# Patient Record
Sex: FEMALE | Race: WHITE | NOT HISPANIC OR LATINO | URBAN - METROPOLITAN AREA
[De-identification: names, ages, dates, MRNs, and addresses within clinical notes are randomized per-mention and may not be internally consistent; named-entity substitution may affect disease eponyms.]

---

## 2019-11-08 PROBLEM — Z00.00 ENCOUNTER FOR PREVENTIVE HEALTH EXAMINATION: Status: ACTIVE | Noted: 2019-11-08

## 2019-12-04 ENCOUNTER — OUTPATIENT (OUTPATIENT)
Dept: OUTPATIENT SERVICES | Facility: HOSPITAL | Age: 49
LOS: 1 days | Discharge: HOME | End: 2019-12-04

## 2019-12-04 ENCOUNTER — APPOINTMENT (OUTPATIENT)
Dept: OBGYN | Facility: HOSPITAL | Age: 49
End: 2019-12-04
Payer: SELF-PAY

## 2019-12-04 VITALS
SYSTOLIC BLOOD PRESSURE: 120 MMHG | DIASTOLIC BLOOD PRESSURE: 70 MMHG | RESPIRATION RATE: 14 BRPM | HEART RATE: 72 BPM | WEIGHT: 156 LBS | TEMPERATURE: 97.1 F

## 2019-12-04 DIAGNOSIS — Z78.9 OTHER SPECIFIED HEALTH STATUS: ICD-10-CM

## 2019-12-04 DIAGNOSIS — Z01.419 ENCOUNTER FOR GYNECOLOGICAL EXAMINATION (GENERAL) (ROUTINE) W/OUT ABNORMAL FINDINGS: ICD-10-CM

## 2019-12-04 PROCEDURE — 99386 PREV VISIT NEW AGE 40-64: CPT

## 2019-12-11 DIAGNOSIS — Z01.419 ENCOUNTER FOR GYNECOLOGICAL EXAMINATION (GENERAL) (ROUTINE) WITHOUT ABNORMAL FINDINGS: ICD-10-CM

## 2019-12-13 ENCOUNTER — RX CHANGE (OUTPATIENT)
Age: 49
End: 2019-12-13

## 2019-12-13 LAB
A VAGINAE DNA VAG QL NAA+PROBE: ABNORMAL
BVAB2 DNA VAG QL NAA+PROBE: NORMAL
C KRUSEI DNA VAG QL NAA+PROBE: NEGATIVE
C TRACH RRNA SPEC QL NAA+PROBE: NEGATIVE
HPV HIGH+LOW RISK DNA PNL CVX: NOT DETECTED
MEGA1 DNA VAG QL NAA+PROBE: ABNORMAL
N GONORRHOEA RRNA SPEC QL NAA+PROBE: NEGATIVE
T VAGINALIS RRNA SPEC QL NAA+PROBE: NEGATIVE

## 2019-12-13 RX ORDER — METRONIDAZOLE 500 MG/1
500 TABLET ORAL TWICE DAILY
Qty: 14 | Refills: 0 | Status: ACTIVE | COMMUNITY
Start: 2019-12-13 | End: 1900-01-01

## 2020-01-30 ENCOUNTER — APPOINTMENT (OUTPATIENT)
Dept: INTERNAL MEDICINE | Facility: HOSPITAL | Age: 50
End: 2020-01-30

## 2022-01-11 ENCOUNTER — INPATIENT (INPATIENT)
Facility: HOSPITAL | Age: 52
LOS: 3 days | Discharge: HOME | End: 2022-01-15
Attending: INTERNAL MEDICINE | Admitting: INTERNAL MEDICINE
Payer: SELF-PAY

## 2022-01-11 VITALS
RESPIRATION RATE: 18 BRPM | DIASTOLIC BLOOD PRESSURE: 59 MMHG | HEART RATE: 88 BPM | SYSTOLIC BLOOD PRESSURE: 115 MMHG | TEMPERATURE: 99 F | OXYGEN SATURATION: 98 %

## 2022-01-11 LAB
ALBUMIN SERPL ELPH-MCNC: 4.3 G/DL — SIGNIFICANT CHANGE UP (ref 3.5–5.2)
ALP SERPL-CCNC: 120 U/L — HIGH (ref 30–115)
ALT FLD-CCNC: 90 U/L — HIGH (ref 0–41)
ANION GAP SERPL CALC-SCNC: 10 MMOL/L — SIGNIFICANT CHANGE UP (ref 7–14)
AST SERPL-CCNC: 38 U/L — SIGNIFICANT CHANGE UP (ref 0–41)
BASOPHILS # BLD AUTO: 0.02 K/UL — SIGNIFICANT CHANGE UP (ref 0–0.2)
BASOPHILS NFR BLD AUTO: 0.3 % — SIGNIFICANT CHANGE UP (ref 0–1)
BILIRUB SERPL-MCNC: 2.7 MG/DL — HIGH (ref 0.2–1.2)
BLD GP AB SCN SERPL QL: SIGNIFICANT CHANGE UP
BUN SERPL-MCNC: 14 MG/DL — SIGNIFICANT CHANGE UP (ref 10–20)
CALCIUM SERPL-MCNC: 8.2 MG/DL — LOW (ref 8.5–10.1)
CHLORIDE SERPL-SCNC: 103 MMOL/L — SIGNIFICANT CHANGE UP (ref 98–110)
CO2 SERPL-SCNC: 22 MMOL/L — SIGNIFICANT CHANGE UP (ref 17–32)
CREAT SERPL-MCNC: 0.5 MG/DL — LOW (ref 0.7–1.5)
EOSINOPHIL # BLD AUTO: 0.27 K/UL — SIGNIFICANT CHANGE UP (ref 0–0.7)
EOSINOPHIL NFR BLD AUTO: 4.1 % — SIGNIFICANT CHANGE UP (ref 0–8)
GLUCOSE SERPL-MCNC: 111 MG/DL — HIGH (ref 70–99)
HCG SERPL QL: NEGATIVE — SIGNIFICANT CHANGE UP
HCT VFR BLD CALC: 17.9 % — LOW (ref 37–47)
HGB BLD-MCNC: 5.6 G/DL — CRITICAL LOW (ref 12–16)
IMM GRANULOCYTES NFR BLD AUTO: 1.4 % — HIGH (ref 0.1–0.3)
LYMPHOCYTES # BLD AUTO: 1.94 K/UL — SIGNIFICANT CHANGE UP (ref 1.2–3.4)
LYMPHOCYTES # BLD AUTO: 29.2 % — SIGNIFICANT CHANGE UP (ref 20.5–51.1)
MAGNESIUM SERPL-MCNC: 2.1 MG/DL — SIGNIFICANT CHANGE UP (ref 1.8–2.4)
MCHC RBC-ENTMCNC: 31.1 PG — HIGH (ref 27–31)
MCHC RBC-ENTMCNC: 31.3 G/DL — LOW (ref 32–37)
MCV RBC AUTO: 99.4 FL — HIGH (ref 81–99)
MONOCYTES # BLD AUTO: 0.3 K/UL — SIGNIFICANT CHANGE UP (ref 0.1–0.6)
MONOCYTES NFR BLD AUTO: 4.5 % — SIGNIFICANT CHANGE UP (ref 1.7–9.3)
NEUTROPHILS # BLD AUTO: 4.02 K/UL — SIGNIFICANT CHANGE UP (ref 1.4–6.5)
NEUTROPHILS NFR BLD AUTO: 60.5 % — SIGNIFICANT CHANGE UP (ref 42.2–75.2)
NRBC # BLD: 0 /100 WBCS — SIGNIFICANT CHANGE UP (ref 0–0)
NT-PROBNP SERPL-SCNC: 70 PG/ML — SIGNIFICANT CHANGE UP (ref 0–300)
PLATELET # BLD AUTO: 467 K/UL — HIGH (ref 130–400)
POTASSIUM SERPL-MCNC: 4.1 MMOL/L — SIGNIFICANT CHANGE UP (ref 3.5–5)
POTASSIUM SERPL-SCNC: 4.1 MMOL/L — SIGNIFICANT CHANGE UP (ref 3.5–5)
PROT SERPL-MCNC: 6.8 G/DL — SIGNIFICANT CHANGE UP (ref 6–8)
RBC # BLD: 1.77 M/UL — LOW (ref 4.2–5.4)
RBC # BLD: 1.8 M/UL — LOW (ref 4.2–5.4)
RBC # FLD: 19.3 % — HIGH (ref 11.5–14.5)
RETICS #: 197.4 K/UL — HIGH (ref 25–125)
RETICS/RBC NFR: 11.2 % — HIGH (ref 0.5–1.5)
SARS-COV-2 RNA SPEC QL NAA+PROBE: DETECTED
SODIUM SERPL-SCNC: 135 MMOL/L — SIGNIFICANT CHANGE UP (ref 135–146)
TROPONIN T SERPL-MCNC: <0.01 NG/ML — SIGNIFICANT CHANGE UP
WBC # BLD: 6.64 K/UL — SIGNIFICANT CHANGE UP (ref 4.8–10.8)
WBC # FLD AUTO: 6.64 K/UL — SIGNIFICANT CHANGE UP (ref 4.8–10.8)

## 2022-01-11 PROCEDURE — 93010 ELECTROCARDIOGRAM REPORT: CPT

## 2022-01-11 PROCEDURE — 99285 EMERGENCY DEPT VISIT HI MDM: CPT

## 2022-01-11 PROCEDURE — 71045 X-RAY EXAM CHEST 1 VIEW: CPT | Mod: 26

## 2022-01-11 PROCEDURE — 99223 1ST HOSP IP/OBS HIGH 75: CPT

## 2022-01-11 RX ORDER — ONDANSETRON 8 MG/1
4 TABLET, FILM COATED ORAL EVERY 8 HOURS
Refills: 0 | Status: DISCONTINUED | OUTPATIENT
Start: 2022-01-11 | End: 2022-01-15

## 2022-01-11 RX ORDER — ACETAMINOPHEN 500 MG
650 TABLET ORAL EVERY 6 HOURS
Refills: 0 | Status: DISCONTINUED | OUTPATIENT
Start: 2022-01-11 | End: 2022-01-15

## 2022-01-11 RX ORDER — LANOLIN ALCOHOL/MO/W.PET/CERES
5 CREAM (GRAM) TOPICAL AT BEDTIME
Refills: 0 | Status: DISCONTINUED | OUTPATIENT
Start: 2022-01-11 | End: 2022-01-15

## 2022-01-11 RX ORDER — SODIUM CHLORIDE 9 MG/ML
1000 INJECTION, SOLUTION INTRAVENOUS
Refills: 0 | Status: DISCONTINUED | OUTPATIENT
Start: 2022-01-11 | End: 2022-01-15

## 2022-01-11 RX ORDER — ASPIRIN/CALCIUM CARB/MAGNESIUM 324 MG
325 TABLET ORAL ONCE
Refills: 0 | Status: COMPLETED | OUTPATIENT
Start: 2022-01-11 | End: 2022-01-11

## 2022-01-11 RX ORDER — ACETAMINOPHEN 500 MG
650 TABLET ORAL ONCE
Refills: 0 | Status: COMPLETED | OUTPATIENT
Start: 2022-01-11 | End: 2022-01-11

## 2022-01-11 RX ADMIN — Medication 650 MILLIGRAM(S): at 19:15

## 2022-01-11 RX ADMIN — Medication 325 MILLIGRAM(S): at 18:35

## 2022-01-11 NOTE — H&P ADULT - HISTORY OF PRESENT ILLNESS
51F no pmhx presents with pinching chest pain that started yesterday, intermittent, worse with certain motions, worse when she climbs the stairs, denies syncope/diaphoresis/shortness of breath. no echo/stress tests in the past, does not follow with cards.  51F no pmhx presents with PELAYO,  pinching chest pain that started yesterday, intermittent, worse with certain motions, worse when she climbs the stairs, denies syncope/diaphoresis/shortness of breath. no echo/stress tests in the past, does not follow with cards.  Pt denies BRBPR, & melena.  Pt appears NAD accompanied by spouse providing additional HPI.

## 2022-01-11 NOTE — H&P ADULT - ASSESSMENT
51F no pmhx presents with pinching chest pain that started yesterday, intermittent, worse with certain motions, worse when she climbs the stairs, denies syncope/diaphoresis/shortness of breath. no echo/stress tests in the past, does not follow with cards.      # Anemia  - PRBC X   - trend labs  - Hem/Onc consult      51F no pmhx presents with PELAYO,  pinching chest pain that started yesterday, intermittent, worse with certain motions, worse when she climbs the stairs, denies syncope/diaphoresis/shortness of breath. no echo/stress tests in the past, does not follow with cards.  Pt denies BRBPR, & melena.  Pt appears NAD accompanied by spouse providing additional HPI.    Pt denies home meds.        # Anemia  - PRBC X 2  - trend labs  - Hem/Onc consult  - Oxygen 2ltr NC          51F no pmhx presents with PELAYO,  pinching chest pain that started yesterday, intermittent, worse with certain motions, worse when she climbs the stairs, denies syncope/diaphoresis/shortness of breath. no echo/stress tests in the past, does not follow with cards.  Pt denies BRBPR, & melena.  Pt appears NAD accompanied by spouse providing additional HPI.    Pt denies home meds.        # Anemia  - PRBC X 2  - trend labs  - Hematology Consult  - Oxygen 2ltr NC  - SHANTHI:  negative.

## 2022-01-11 NOTE — ED PROVIDER NOTE - CLINICAL SUMMARY MEDICAL DECISION MAKING FREE TEXT BOX
52yo F no significant past medical history presenting with intermittent chest pain, worse on exertion.  no cardiac history. pale appearing, NAD, non toxic. NCAT PERRLA EOMI neck supple non tender normal wob cta bl rrr abdomen s nt nd no rebound no guarding WWPx4 neuro non focal. hb low. SHANTHI neg. will transfuse and admit for anemia workup.

## 2022-01-11 NOTE — H&P ADULT - EXTREMITIES
----- Message from Jorge Garcia sent at 10/21/2020 12:45 PM CDT -----  Regarding: results of mammo  Type:  Test Results    Who Called: Carol     Name of Test (Lab/Mammo/Etc): mammo    Date of Test: 10/20    Ordering Provider: Dr. Yadav     Where the test was performed: [facility]     Would the patient rather a call back or a response via [facility]? Call back    Best Call Back Number: [phone]      
Left voicemail advising patient that her the radiology report is not back. We will contact her with her results.     
No cyanosis, clubbing or edema

## 2022-01-11 NOTE — ED ADULT TRIAGE NOTE - ESI TRIAGE ACUITY LEVEL, MLM
MD received a form from [facility] and it states it's needed for preventative care for COPD. Patient does not have dx listed.     Called patient and discussed that extended follow up (40 min) for evaluation is needed prior to completing forms. Patient verbalized understanding but was unable to schedule at the time of the call.  
2

## 2022-01-11 NOTE — ED PROVIDER NOTE - OBJECTIVE STATEMENT
51F no pmhx presents with pinching chest pain that started yesterday, intermittent, worse with certain motions, worse when she climbs the stairs, denies syncope/diaphoresis/shortness of breath. no echo/stress tests in the past, does not follow with cards.

## 2022-01-11 NOTE — ED ADULT NURSE REASSESSMENT NOTE - NS ED NURSE REASSESS COMMENT FT1
pt admitted to medicine to be transferred to [address] ED. Charge ABEBE Parker aware of transfer. pt covid +, vss at this time. pt to get 2 units PRBC pending t&s.

## 2022-01-11 NOTE — H&P ADULT - ATTENDING COMMENTS
SHANTHI done in ER is negative Seen at bedside along side medical PA. Of note, SHANTHI done in ER is negative (with negative troponin and normal EKG strongly doubt any cardiac issue. . Agree with assessment and plan as outlined above

## 2022-01-12 LAB
BASOPHILS # BLD AUTO: 0.02 K/UL — SIGNIFICANT CHANGE UP (ref 0–0.2)
BASOPHILS NFR BLD AUTO: 0.3 % — SIGNIFICANT CHANGE UP (ref 0–1)
D DIMER BLD IA.RAPID-MCNC: 785 NG/ML DDU — HIGH (ref 0–230)
EOSINOPHIL # BLD AUTO: 0.3 K/UL — SIGNIFICANT CHANGE UP (ref 0–0.7)
EOSINOPHIL NFR BLD AUTO: 4.9 % — SIGNIFICANT CHANGE UP (ref 0–8)
HCT VFR BLD CALC: 24.4 % — LOW (ref 37–47)
HCT VFR BLD CALC: 24.9 % — LOW (ref 37–47)
HGB BLD-MCNC: 7.7 G/DL — LOW (ref 12–16)
HGB BLD-MCNC: 8 G/DL — LOW (ref 12–16)
IMM GRANULOCYTES NFR BLD AUTO: 0.8 % — HIGH (ref 0.1–0.3)
IRON SATN MFR SERPL: 166 UG/DL — HIGH (ref 35–150)
IRON SATN MFR SERPL: 50 % — SIGNIFICANT CHANGE UP (ref 15–50)
LYMPHOCYTES # BLD AUTO: 2.06 K/UL — SIGNIFICANT CHANGE UP (ref 1.2–3.4)
LYMPHOCYTES # BLD AUTO: 33.4 % — SIGNIFICANT CHANGE UP (ref 20.5–51.1)
MCHC RBC-ENTMCNC: 29.2 PG — SIGNIFICANT CHANGE UP (ref 27–31)
MCHC RBC-ENTMCNC: 29.2 PG — SIGNIFICANT CHANGE UP (ref 27–31)
MCHC RBC-ENTMCNC: 31.6 G/DL — LOW (ref 32–37)
MCHC RBC-ENTMCNC: 32.1 G/DL — SIGNIFICANT CHANGE UP (ref 32–37)
MCV RBC AUTO: 90.9 FL — SIGNIFICANT CHANGE UP (ref 81–99)
MCV RBC AUTO: 92.4 FL — SIGNIFICANT CHANGE UP (ref 81–99)
MONOCYTES # BLD AUTO: 0.28 K/UL — SIGNIFICANT CHANGE UP (ref 0.1–0.6)
MONOCYTES NFR BLD AUTO: 4.5 % — SIGNIFICANT CHANGE UP (ref 1.7–9.3)
NEUTROPHILS # BLD AUTO: 3.45 K/UL — SIGNIFICANT CHANGE UP (ref 1.4–6.5)
NEUTROPHILS NFR BLD AUTO: 56.1 % — SIGNIFICANT CHANGE UP (ref 42.2–75.2)
NRBC # BLD: 0 /100 WBCS — SIGNIFICANT CHANGE UP (ref 0–0)
NRBC # BLD: 0 /100 WBCS — SIGNIFICANT CHANGE UP (ref 0–0)
PLATELET # BLD AUTO: 398 K/UL — SIGNIFICANT CHANGE UP (ref 130–400)
PLATELET # BLD AUTO: 450 K/UL — HIGH (ref 130–400)
RBC # BLD: 2.64 M/UL — LOW (ref 4.2–5.4)
RBC # BLD: 2.74 M/UL — LOW (ref 4.2–5.4)
RBC # FLD: 22.4 % — HIGH (ref 11.5–14.5)
RBC # FLD: 23.9 % — HIGH (ref 11.5–14.5)
TIBC SERPL-MCNC: 330 UG/DL — SIGNIFICANT CHANGE UP (ref 220–430)
TROPONIN T SERPL-MCNC: <0.01 NG/ML — SIGNIFICANT CHANGE UP
TROPONIN T SERPL-MCNC: <0.01 NG/ML — SIGNIFICANT CHANGE UP
TSH SERPL-MCNC: 0.99 UIU/ML — SIGNIFICANT CHANGE UP (ref 0.27–4.2)
UIBC SERPL-MCNC: 164 UG/DL — SIGNIFICANT CHANGE UP (ref 110–370)
WBC # BLD: 6.16 K/UL — SIGNIFICANT CHANGE UP (ref 4.8–10.8)
WBC # BLD: 7.46 K/UL — SIGNIFICANT CHANGE UP (ref 4.8–10.8)
WBC # FLD AUTO: 6.16 K/UL — SIGNIFICANT CHANGE UP (ref 4.8–10.8)
WBC # FLD AUTO: 7.46 K/UL — SIGNIFICANT CHANGE UP (ref 4.8–10.8)

## 2022-01-12 PROCEDURE — 99222 1ST HOSP IP/OBS MODERATE 55: CPT

## 2022-01-12 PROCEDURE — 99233 SBSQ HOSP IP/OBS HIGH 50: CPT

## 2022-01-12 PROCEDURE — 99223 1ST HOSP IP/OBS HIGH 75: CPT

## 2022-01-12 RX ORDER — INFLUENZA VIRUS VACCINE 15; 15; 15; 15 UG/.5ML; UG/.5ML; UG/.5ML; UG/.5ML
0.5 SUSPENSION INTRAMUSCULAR ONCE
Refills: 0 | Status: DISCONTINUED | OUTPATIENT
Start: 2022-01-12 | End: 2022-01-15

## 2022-01-12 RX ADMIN — SODIUM CHLORIDE 100 MILLILITER(S): 9 INJECTION, SOLUTION INTRAVENOUS at 19:35

## 2022-01-12 RX ADMIN — SODIUM CHLORIDE 100 MILLILITER(S): 9 INJECTION, SOLUTION INTRAVENOUS at 18:06

## 2022-01-12 NOTE — CONSULT NOTE ADULT - ASSESSMENT
51F no pmhx presents with PELAYO,  pinching chest pain that started yesterday, intermittent, worse with certain motions, worse when she climbs the stairs, denies syncope/diaphoresis/shortness of breath.     #Symptomatic Anemia (Macrocytic)  #COVID +ve  - Hemodynamically stable and no overt GI Bleeding  - Pt endorses menorrhagia and prolonged periods upto 8 days for the past 2 months  - Iron studies not consistent w/ iron deficiency anemia    Rec  - protonix 40mg qdaily  - obgyn eval for AUB  - pt will benefit from outpatient egd and colonoscopy. Will consider inpatient workup if overt evidence of bleeding per GI tract  - please obtain B12, folate and tsh  - Target Hb >8  - Monitor cbc qdaily  - recall prn    51F no pmhx presents with PELAYO,  pinching chest pain that started yesterday, intermittent, worse with certain motions, worse when she climbs the stairs, denies syncope/diaphoresis/shortness of breath.     #Symptomatic Anemia (Macrocytic)  #COVID +ve  - Hemodynamically stable and no overt GI Bleeding  - Pt endorses menorrhagia and prolonged periods up to 8 days for the past 2 months  - Iron studies not consistent w/ iron deficiency anemia    Rec  - protonix 40mg qdaily  - obgyn eval for AUB  - pt will benefit from outpatient egd and colonoscopy. Will consider inpatient workup if overt evidence of bleeding per GI tract  - please obtain B12, folate and tsh  - Target Hb >8  - Monitor cbc qdaily  - recall prn

## 2022-01-12 NOTE — PROGRESS NOTE ADULT - TIME BILLING
Patient was seen & examined independently on bedside.   Latest vital signs, labs, imaging studies were reviewed today.  Consults reviewed.  Case was discussed with house staff in morning rounds for assessment and plan.  Handoff: Anticipated for discharge in 24-48 hrs

## 2022-01-12 NOTE — CONSULT NOTE ADULT - ASSESSMENT
51 y.o female patient with no significant PMH presents with chest pain; found to have Hb of 5.6    # Chest pain  - ECG with no acute ischemic changes  - Troponin <0.01 x1  - Hemodynamically stable  - In light of severe anemia, no indications for invasive procedures as risks outweigh the benefits; also, use of anti-platelets and anticoagulation are not indicated of light of severe anemia  - Work-up for anemia  - Trend cardiac enzymes  - Will consider CCTA once Hb stable  51 y.o female patient with no significant PMH presents with chest pain; found to have Hb of 5.6    # Chest pain  - ECG with no acute ischemic changes  - Troponin <0.01 x1  - Hemodynamically stable  - In light of severe anemia, no indications for invasive procedures as risks outweigh the benefits; also, use of anti-platelets and anticoagulation are not indicated of light of severe anemia  - Work-up for anemia  - Check D-Dimer  - Trend cardiac enzymes  - Will consider CCTA once Hb stable

## 2022-01-12 NOTE — PATIENT PROFILE ADULT - FALL HARM RISK - HARM RISK INTERVENTIONS

## 2022-01-12 NOTE — PROGRESS NOTE ADULT - ASSESSMENT
51 year old patient, with no PMHx presented with chest pain, stabbing in nature, worse with movements, associated with tachycardia and neck pressure.  In ED, she was found to have a HB of 5.4 and to be COVID positive.     # Anemia  - In ED, Hb 5.4 s/p PRBC X 2  - SHANTHI: negative  - Anemia workup: Iron 166; TIBC 330  - Monitor CBC  - Hematology Consult     #Chest pain worsened with movements/Tachycardia  - EKG: normal sinus rhythm  - Cardiology consult    #COVID  - Chest X-ray:   - On RA  - Asymptomatic    Diet: Regular  Activity: IAT  DVT ppx:  GI ppx: none   51 year old patient, with no PMHx presented with chest pain, stabbing in nature, worse with movements, associated with tachycardia and neck pressure.  In ED, she was found to have a HB of 5.4 and to be COVID positive.     # Anemia  - In ED, Hb 5.4 s/p PRBC X 2  - SHANTHI: negative  - Anemia workup: Iron 166; TIBC 330  - Monitor CBC  - GI Consult     #Chest pain worsened with movements/Tachycardia  - EKG: normal sinus rhythm  - Troponin <0.01; follow up second set  - follow up D-dimer  - Follow up Echo    #COVID  - Chest X-ray:   - On RA  - Asymptomatic    Diet: Regular  Activity: IAT  DVT ppx:none  GI ppx: none

## 2022-01-13 LAB
ALBUMIN SERPL ELPH-MCNC: 4.5 G/DL — SIGNIFICANT CHANGE UP (ref 3.5–5.2)
ALP SERPL-CCNC: 126 U/L — HIGH (ref 30–115)
ALT FLD-CCNC: 94 U/L — HIGH (ref 0–41)
ANION GAP SERPL CALC-SCNC: 13 MMOL/L — SIGNIFICANT CHANGE UP (ref 7–14)
AST SERPL-CCNC: 40 U/L — SIGNIFICANT CHANGE UP (ref 0–41)
BASOPHILS # BLD AUTO: 0.03 K/UL — SIGNIFICANT CHANGE UP (ref 0–0.2)
BASOPHILS NFR BLD AUTO: 0.5 % — SIGNIFICANT CHANGE UP (ref 0–1)
BILIRUB SERPL-MCNC: 2.7 MG/DL — HIGH (ref 0.2–1.2)
BUN SERPL-MCNC: 10 MG/DL — SIGNIFICANT CHANGE UP (ref 10–20)
CALCIUM SERPL-MCNC: 9.2 MG/DL — SIGNIFICANT CHANGE UP (ref 8.5–10.1)
CHLORIDE SERPL-SCNC: 102 MMOL/L — SIGNIFICANT CHANGE UP (ref 98–110)
CO2 SERPL-SCNC: 21 MMOL/L — SIGNIFICANT CHANGE UP (ref 17–32)
CREAT SERPL-MCNC: 0.5 MG/DL — LOW (ref 0.7–1.5)
EOSINOPHIL # BLD AUTO: 0.19 K/UL — SIGNIFICANT CHANGE UP (ref 0–0.7)
EOSINOPHIL NFR BLD AUTO: 3.3 % — SIGNIFICANT CHANGE UP (ref 0–8)
FERRITIN SERPL-MCNC: 1378 NG/ML — HIGH (ref 15–150)
FOLATE RBC-MCNC: 1628 NG/ML — HIGH (ref 499–1504)
FOLATE SERPL-MCNC: 9.2 NG/ML — SIGNIFICANT CHANGE UP
GLUCOSE SERPL-MCNC: 92 MG/DL — SIGNIFICANT CHANGE UP (ref 70–99)
HCT VFR BLD CALC: 19.9 % — CRITICAL LOW (ref 34.5–45)
HCT VFR BLD CALC: 26.6 % — LOW (ref 37–47)
HGB BLD-MCNC: 8.6 G/DL — LOW (ref 12–16)
IMM GRANULOCYTES NFR BLD AUTO: 0.7 % — HIGH (ref 0.1–0.3)
LYMPHOCYTES # BLD AUTO: 1.76 K/UL — SIGNIFICANT CHANGE UP (ref 1.2–3.4)
LYMPHOCYTES # BLD AUTO: 30.4 % — SIGNIFICANT CHANGE UP (ref 20.5–51.1)
MAGNESIUM SERPL-MCNC: 2.1 MG/DL — SIGNIFICANT CHANGE UP (ref 1.8–2.4)
MCHC RBC-ENTMCNC: 29.7 PG — SIGNIFICANT CHANGE UP (ref 27–31)
MCHC RBC-ENTMCNC: 32.3 G/DL — SIGNIFICANT CHANGE UP (ref 32–37)
MCV RBC AUTO: 91.7 FL — SIGNIFICANT CHANGE UP (ref 81–99)
MONOCYTES # BLD AUTO: 0.27 K/UL — SIGNIFICANT CHANGE UP (ref 0.1–0.6)
MONOCYTES NFR BLD AUTO: 4.7 % — SIGNIFICANT CHANGE UP (ref 1.7–9.3)
NEUTROPHILS # BLD AUTO: 3.5 K/UL — SIGNIFICANT CHANGE UP (ref 1.4–6.5)
NEUTROPHILS NFR BLD AUTO: 60.4 % — SIGNIFICANT CHANGE UP (ref 42.2–75.2)
NRBC # BLD: 0 /100 WBCS — SIGNIFICANT CHANGE UP (ref 0–0)
PHOSPHATE SERPL-MCNC: 3.7 MG/DL — SIGNIFICANT CHANGE UP (ref 2.1–4.9)
PHOSPHATE SERPL-MCNC: 3.7 MG/DL — SIGNIFICANT CHANGE UP (ref 2.1–4.9)
PLATELET # BLD AUTO: 450 K/UL — HIGH (ref 130–400)
POTASSIUM SERPL-MCNC: 4.4 MMOL/L — SIGNIFICANT CHANGE UP (ref 3.5–5)
POTASSIUM SERPL-SCNC: 4.4 MMOL/L — SIGNIFICANT CHANGE UP (ref 3.5–5)
PROT SERPL-MCNC: 7.2 G/DL — SIGNIFICANT CHANGE UP (ref 6–8)
RBC # BLD: 2.9 M/UL — LOW (ref 4.2–5.4)
RBC # FLD: 22.8 % — HIGH (ref 11.5–14.5)
SODIUM SERPL-SCNC: 136 MMOL/L — SIGNIFICANT CHANGE UP (ref 135–146)
TSH SERPL-MCNC: 0.62 UIU/ML — SIGNIFICANT CHANGE UP (ref 0.27–4.2)
VIT B12 SERPL-MCNC: 367 PG/ML — SIGNIFICANT CHANGE UP (ref 232–1245)
WBC # BLD: 5.79 K/UL — SIGNIFICANT CHANGE UP (ref 4.8–10.8)
WBC # FLD AUTO: 5.79 K/UL — SIGNIFICANT CHANGE UP (ref 4.8–10.8)

## 2022-01-13 PROCEDURE — 71275 CT ANGIOGRAPHY CHEST: CPT | Mod: 26

## 2022-01-13 PROCEDURE — 99233 SBSQ HOSP IP/OBS HIGH 50: CPT

## 2022-01-13 RX ADMIN — SODIUM CHLORIDE 100 MILLILITER(S): 9 INJECTION, SOLUTION INTRAVENOUS at 13:38

## 2022-01-13 NOTE — PROGRESS NOTE ADULT - ASSESSMENT
51 year old patient, with no PMHx presented with chest pain, stabbing in nature, worse with movements, associated with tachycardia and neck pressure.  In ED, she was found to have a HB of 5.4 and to be COVID positive.     # Anemia  - In ED, Hb 5.4 s/p PRBC X 2  - SHANTHI: negative  - Anemia workup: Iron 166; TIBC 330  - Monitor CBC  - GI Consult     #Chest pain worsened with movements/Tachycardia  - EKG: normal sinus rhythm  - Troponin <0.01 x2  - D-dimer   - Follow up Echo    #COVID  - Chest X-ray:   - On RA  - Asymptomatic    Diet: Regular  Activity: IAT  DVT ppx:none  GI ppx: none   51 year old patient, with no PMHx presented with chest pain, stabbing in nature, worse with movements, associated with tachycardia and neck pressure.  In ED, she was found to have a HB of 5.4 and to be COVID positive.     # Anemia  - In ED, Hb 5.4 s/p PRBC X 3  - SHANTHI: negative  - Anemia workup: Iron 166; TIBC 330  - Monitor CBC  - GI Consult: will benefit from outpatient egd and colonoscopy. Will consider inpatient workup if overt evidence of bleeding per GI tract.  - Keep Hb>8  - GYN consult for AUB    #Chest pain worsened with movements/Tachycardia  - EKG: normal sinus rhythm  - Troponin <0.01 x2  - D-dimer 785; Follow up CT Chest Angiogram  - Follow up Echo  - Cardiology on board: In light of severe anemia, no indications for invasive procedures as risks outweigh the benefits; also, use of anti-platelets and anticoagulation are not indicated of light of severe anemia. Will consider CCTA once Hb stable.     #COVID  - Chest X-ray: Negative  - On RA  - Asymptomatic    Diet: Regular  Activity: IAT  DVT ppx:none  GI ppx: none

## 2022-01-13 NOTE — CONSULT NOTE ADULT - ASSESSMENT
52 yo P2 with LMP 12/20 presented to the ED for SOB and chest pain, found to be covid positive, now with incidental anemia and hbg of 5.6, with AUB-N rule out malignancy, currently clinically and hemodynamically stable.    - discussed different causes of AUB contributing to anemia including polyps vs. fibroids vs. malignany  - recommend TVUS to rule out fibroids/polyps, gyn team to follow  - patient currently unsure about endometrial biopsy, will discuss again tomorrow  - outpatient follow-up with Dr. Ptoter @ [facility]  - further care per primary team  - please call [phone] with any further questions    Dr. Shore and Dr. Potter aware.

## 2022-01-13 NOTE — PROGRESS NOTE ADULT - TIME BILLING
51 year old patient, with no PMHx presented with chest pain, stabbing in nature, worse with movements, associated with tachycardia and neck pressure.      Acute normocytic Anemia, possible due to menorrhagia  Received 3 units of PRBC   GYN consult pending for possible GYN source for acute anemia  Chest pain worsened with movements/Tachycardia-possible symptomatic anemia. Likely non-cardiac  EKG & trops normal  COVID-no clinical PNA- Asymptomatic  Elevated d-dimer- PE ruled out    Handoff: Anticipated for discharge tomorrow pending GYN a 51 year old patient, with no PMHx presented with chest pain, stabbing in nature, worse with movements, associated with tachycardia and neck pressure.      Acute normocytic Anemia, possible due to menorrhagia  Received 3 units of PRBC   GYN consult pending for possible GYN source for acute anemia  Chest pain worsened with movements/Tachycardia-possible symptomatic anemia. Likely non-cardiac  EKG & trops normal  COVID-no clinical PNA- Asymptomatic  Elevated d-dimer- PE ruled out  Get Abd US vs TVUS    Handoff: Anticipated for discharge tomorrow pending GYN

## 2022-01-13 NOTE — CONSULT NOTE ADULT - ATTENDING COMMENTS
aub  recommend bx-pt refusing  need tvs  will follow
Seen / examined and above reviewed.    No cardiac history.  No clear risk factors.    Exertional dyspnea / vague chest discomfort in setting of severe anemia.  COVID 19 infection.    Anemia possible secondary to menorrhagia.  No overt GIB,  Receiving PRBC transfusion.    No chest pain / dyspnea when evaluated.  Hemodynamics stable.  Good SaO2 on room air.  No infiltrates on CXR.  EKG: NSR  Ruled-out MI.    Likely symptomatic anemia.  Doubt ACS.    - Transfuse PRBC.  - GYN evaluation.  - ECHO.  - CCTA when Hb stable.
pt who presents with symptomatic anemia, denies rectal bleeding. SHANTHI brown stool. reports menorrhagia. can consider outpt EGD/CF.

## 2022-01-13 NOTE — CONSULT NOTE ADULT - SUBJECTIVE AND OBJECTIVE BOX
Gastroenterology Consultation:    Patient is a 51y old  Female who presents with a chief complaint of Anemia (12 Jan 2022 08:16)        Admitted on: 01-11-22      HPI:   51F no pmhx presents with PELAYO,  pinching chest pain that started yesterday, intermittent, worse with certain motions, worse when she climbs the stairs, denies syncope/diaphoresis/shortness of breath. no echo/stress tests in the past, does not follow with cards.  Pt denies BRBPR, & melena.  Pt appears NAD accompanied by spouse providing additional HPI.   (11 Jan 2022 19:37)        Prior EGD: none     Prior Colonoscopy: none      PAST MEDICAL & SURGICAL HISTORY:  No pertinent past medical history    No significant past surgical history          FAMILY HISTORY:      Social History:  Tobacco: dnies   Alcohol: denies   Drugs: denies    Home Medications:        MEDICATIONS  (STANDING):  influenza   Vaccine 0.5 milliLiter(s) IntraMuscular once  lactated ringers. 1000 milliLiter(s) (100 mL/Hr) IV Continuous <Continuous>    MEDICATIONS  (PRN):  acetaminophen     Tablet .. 650 milliGRAM(s) Oral every 6 hours PRN Temp greater or equal to 38C (100.4F), Mild Pain (1 - 3)  aluminum hydroxide/magnesium hydroxide/simethicone Suspension 30 milliLiter(s) Oral every 4 hours PRN Dyspepsia  melatonin 5 milliGRAM(s) Oral at bedtime PRN Insomnia  ondansetron Injectable 4 milliGRAM(s) IV Push every 8 hours PRN Nausea and/or Vomiting      Allergies  No Known Allergies      Review of Systems:   Constitutional:  No Fever, No Chills  ENT/Mouth:  No Hearing Changes,  No Difficulty Swallowing  Eyes:  No Eye Pain, No Vision Changes  Cardiovascular:  No Chest Pain, No Palpitations  Respiratory:  No Cough, No Dyspnea  Gastrointestinal:  As described in HPI  Musculoskeletal:  No Joint Swelling, No Back Pain  Skin:  No Skin Lesions, No Jaundice  Neuro:  No Syncope, No Dizziness  Heme/Lymph:  No Bruising, No Bleeding.          Physical Examination:  T(C): 36.3 (01-12-22 @ 07:36), Max: 37.1 (01-11-22 @ 17:33)  HR: 79 (01-12-22 @ 07:36) (79 - 97)  BP: 104/53 (01-12-22 @ 07:36) (97/52 - 115/59)  RR: 18 (01-12-22 @ 07:36) (18 - 18)  SpO2: 98% (01-12-22 @ 07:36) (98% - 99%)  Height (cm): 165 (01-11-22 @ 17:44)  Weight (kg): 80 (01-11-22 @ 17:44)        GENERAL: AAOx3, no acute distress.  HEAD:  Atraumatic, Normocephalic  EYES: conjunctiva and sclera clear  NECK: Supple, no JVD or thyromegaly  CHEST/LUNG: Clear to auscultation bilaterally; No wheeze, rhonchi, or rales  HEART: Regular rate and rhythm; normal S1, S2, No murmurs.  ABDOMEN: Soft, nontender, nondistended; Bowel sounds present. SHANTHI: brown stool  NEUROLOGY: No asterixis or tremor.   SKIN: Intact, no jaundice        Data:                        5.6    6.64  )-----------( 467      ( 11 Jan 2022 17:53 )             17.9     Hgb Trend:  5.6  01-11-22 @ 17:53      01-11    135  |  103  |  14  ----------------------------<  111<H>  4.1   |  22  |  0.5<L>    Ca    8.2<L>      11 Jan 2022 17:53  Mg     2.1     01-11    TPro  6.8  /  Alb  4.3  /  TBili  2.7<H>  /  DBili  x   /  AST  38  /  ALT  90<H>  /  AlkPhos  120<H>  01-11    Liver panel trend:  TBili 2.7   /   AST 38   /   ALT 90   /   AlkP 120   /   Tptn 6.8   /   Alb 4.3    /   DBili --      01-11              Radiology:      
Chief Complaint: anemia    HPI:   52yo P2 with LMP  presented to the ED for SOB and chest pain, found to be covid positive. She was incidentally found to be anemic with a hbg 5.6. She received three units with improvement in her hgb to 8.0. Gyn consulted for possible abnormal uterine bleeding.  Patient states that prior to November, she had 28day cycles with 3 days of menses; however in the last 3 months she notes 28day cycles with menses lasting 8 days. She states she soaks through 3 pads a day. Currently denying any dizziness, SOB, CP, abdominal pain, dysuria, urinary frequency, urinary urgency, hematuria, melena, hematochezia. Denies weight loss, bloating, night sweats, hot flashes, vomiting, diarrhea or constipation.    Ob/Gyn History:                   LMP -Dec 20                   Cycle Length - 28 days for 8 days  NSVDx2, no complications  Denies history of ovarian cysts, uterine fibroids, abnormal paps, or STIs  Last Pap Smear -  NILM  Last Mammogram - 5 years ago, normal per patient  Last Colonoscopy - never had a colonoscopy    PAST MEDICAL & SURGICAL HISTORY:  No pertinent past medical history    No significant past surgical history    FAMILY HISTORY:  no significant family hx    SOCIAL HISTORY: Denies cigarette use, alcohol use, or illicit drug use    Vital Signs Last 24 Hrs  T(F): 98.4 (2022 07:32), Max: 98.4 (2022 07:32)  HR: 78 (2022 07:32) (78 - 83)  BP: 112/59 (2022 07:32) (95/56 - 112/59)  RR: 18 (2022 07:32) (18 - 18)    General Appearance - AAOx3, NAD  Heart - S1S2 regular rate and rhythm  Lung - CTA Bilaterally  Abdomen - Soft, nontender, nondistended, no rebound, no rigidity, no guarding,     GYN/Pelvis:    Labia Majora - Normal  Labia Minora - Normal  Clitoris - Normal  Urethra - Normal  Vagina - Normal. no bleeding  Cervix - Normal. No CMT    Uterus:  Size - Normal, 7 week sized  Tenderness - None  Mass - None  Freely mobile    Adnexa:  Masses - None  Tenderness - None      Meds:     acetaminophen     Tablet .. 650 milliGRAM(s) Oral once  aspirin 325 milliGRAM(s) Oral Once      LABS:                        8.6    5.79  )-----------( 450      ( 2022 04:30 )             26.6           136  |  102  |  10  ----------------------------<  92  4.4   |  21  |  0.5<L>    Ca    9.2      2022 04:30  Phos  3.7       Mg     2.1         TPro  7.2  /  Alb  4.5  /  TBili  2.7<H>  /  DBili  x   /  AST  40  /  ALT  94<H>  /  AlkPhos  126<H>      Serum pregnancy test: Negative    Radiology    < from: CT Angio Chest PE Protocol w/ IV Cont (22 @ 08:46) >    PROCEDURE DATE:  2022          INTERPRETATION:  Reason for study:  Possible pulmonary embolism.         Technique: Approximately 80 cc of Optiray 320 was administered  intravenously, followed by approximate 50 cc saline flush.    Multiple transaxial images of the thorax was obtained, utilizing   pulmonary embolism protocol (in order to facilitate opacification of the   pulmonary arterial tree).  20 cc of contrastmaterial was discarded.     MIP, Coronal and sagittal reformatted images were performed to better   evaluate anatomic relationships and for possible preoperative planning.        Comparison: CT thorax none.    Findings:    Pulmonary circulation:    No pulmonary emboli  or right heart strain    identified  Tubes/lines:none    Central airways/ Lung parenchyma/ pleura :Approximate 1 cm right-sided   tracheocele (3/14). No endobronchial obstruction, mass or pleural   effusions are noted. There is no evidence of bronchiectasis or   honeycombing.Bilateral subsegmental basilar discoid atelectasis    Pulmonary nodules:      Chest wall/axilla: unremarkable    Mediastinum/hilum:No adenopathy or mass    Great vessels/cardiac structures:No pericardial effusions. Great vessels   are normal in caliber.    Upper abdomen:Unremarkable    Osseous structures:No focal osseous lesions. Lower thoracic osteophytes   are present.      .    Impression:    No pulmonary emboli or right heart strain.    Incidental right sided tracheocele. (Series 3/14).    --- End of Report ---          < end of copied text >  
HPI:   51F no pmhx presents with PELAYO,  pinching chest pain that started yesterday, intermittent, worse with certain motions, worse when she climbs the stairs, denies syncope/diaphoresis/shortness of breath. no echo/stress tests in the past, does not follow with cards.  Pt denies BRBPR, & melena.  Pt appears NAD accompanied by spouse providing additional HPI.   (11 Jan 2022 19:37)      PAST MEDICAL & SURGICAL HISTORY  No pertinent past medical history    No significant past surgical history        FAMILY HISTORY:  FAMILY HISTORY:      SOCIAL HISTORY:  []smoker  []Alcohol  []Drug    ALLERGIES:  No Known Allergies      MEDICATIONS:  MEDICATIONS  (STANDING):  influenza   Vaccine 0.5 milliLiter(s) IntraMuscular once  lactated ringers. 1000 milliLiter(s) (100 mL/Hr) IV Continuous <Continuous>    MEDICATIONS  (PRN):  acetaminophen     Tablet .. 650 milliGRAM(s) Oral every 6 hours PRN Temp greater or equal to 38C (100.4F), Mild Pain (1 - 3)  aluminum hydroxide/magnesium hydroxide/simethicone Suspension 30 milliLiter(s) Oral every 4 hours PRN Dyspepsia  melatonin 5 milliGRAM(s) Oral at bedtime PRN Insomnia  ondansetron Injectable 4 milliGRAM(s) IV Push every 8 hours PRN Nausea and/or Vomiting      HOME MEDICATIONS:  Home Medications:      VITALS:   T(F): 97.4 (01-12 @ 07:36), Max: 98.7 (01-11 @ 17:33)  HR: 79 (01-12 @ 07:36) (79 - 97)  BP: 104/53 (01-12 @ 07:36) (97/52 - 115/59)  BP(mean): --  RR: 18 (01-12 @ 07:36) (18 - 18)  SpO2: 98% (01-12 @ 07:36) (98% - 99%)    I&O's Summary      REVIEW OF SYSTEMS:  CONSTITUTIONAL: No weakness, fevers or chills  EYES: No visual changes  ENT: No vertigo or throat pain   NECK: No pain or stiffness  RESPIRATORY: No cough, wheezing, hemoptysis; No shortness of breath  CARDIOVASCULAR: Chest pain and palpitations  GASTROINTESTINAL: No abdominal or epigastric pain. No nausea, vomiting, or hematemesis; No diarrhea or constipation. No melena or hematochezia.  GENITOURINARY: No dysuria, frequency or hematuria  NEUROLOGICAL: No numbness or weakness  SKIN: No itching, no rashes  MSK: No pain    PHYSICAL EXAM:  NEURO: patient is awake , alert and oriented  GEN: Not in acute distress  NECK: no thyroid enlargement, no JVD  LUNGS: Clear to auscultation bilaterally   CARDIOVASCULAR: S1/S2 present, RRR  ABD: Soft,  EXT: No PROSPER  SKIN: Intact    LABS:                        7.7    7.46  )-----------( 450      ( 12 Jan 2022 11:00 )             24.4     01-11    135  |  103  |  14  ----------------------------<  111<H>  4.1   |  22  |  0.5<L>    Ca    8.2<L>      11 Jan 2022 17:53  Mg     2.1     01-11    TPro  6.8  /  Alb  4.3  /  TBili  2.7<H>  /  DBili  x   /  AST  38  /  ALT  90<H>  /  AlkPhos  120<H>  01-11      Troponin T, Serum: <0.01 ng/mL (01-11-22 @ 17:53)    CARDIAC MARKERS ( 11 Jan 2022 17:53 )  x     / <0.01 ng/mL / x     / x     / x            Troponin trend:    Serum Pro-Brain Natriuretic Peptide: 70 pg/mL (01-11-22 @ 17:53)          RADIOLOGY:  -CXR:  < from: Xray Chest 1 View- PORTABLE-Urgent (01.11.22 @ 18:37) >  Impression:    No radiographic evidence of acute cardiopulmonary disease.    < end of copied text >    -TTE:  -CCTA:  -STRESS TEST:  -CATHETERIZATION:    ECG: NSR

## 2022-01-14 LAB
ALBUMIN SERPL ELPH-MCNC: 4 G/DL — SIGNIFICANT CHANGE UP (ref 3.5–5.2)
ALP SERPL-CCNC: 119 U/L — HIGH (ref 30–115)
ALT FLD-CCNC: 84 U/L — HIGH (ref 0–41)
ANION GAP SERPL CALC-SCNC: 15 MMOL/L — HIGH (ref 7–14)
AST SERPL-CCNC: 37 U/L — SIGNIFICANT CHANGE UP (ref 0–41)
BASOPHILS # BLD AUTO: 0.02 K/UL — SIGNIFICANT CHANGE UP (ref 0–0.2)
BASOPHILS NFR BLD AUTO: 0.4 % — SIGNIFICANT CHANGE UP (ref 0–1)
BILIRUB SERPL-MCNC: 2.4 MG/DL — HIGH (ref 0.2–1.2)
BUN SERPL-MCNC: 10 MG/DL — SIGNIFICANT CHANGE UP (ref 10–20)
CALCIUM SERPL-MCNC: 8.8 MG/DL — SIGNIFICANT CHANGE UP (ref 8.5–10.1)
CHLORIDE SERPL-SCNC: 105 MMOL/L — SIGNIFICANT CHANGE UP (ref 98–110)
CO2 SERPL-SCNC: 19 MMOL/L — SIGNIFICANT CHANGE UP (ref 17–32)
CREAT SERPL-MCNC: 0.6 MG/DL — LOW (ref 0.7–1.5)
EOSINOPHIL # BLD AUTO: 0.23 K/UL — SIGNIFICANT CHANGE UP (ref 0–0.7)
EOSINOPHIL NFR BLD AUTO: 4.5 % — SIGNIFICANT CHANGE UP (ref 0–8)
FOLATE SERPL-MCNC: 9.1 NG/ML — SIGNIFICANT CHANGE UP
GLUCOSE SERPL-MCNC: 101 MG/DL — HIGH (ref 70–99)
HCT VFR BLD CALC: 24.2 % — LOW (ref 37–47)
HGB BLD-MCNC: 7.8 G/DL — LOW (ref 12–16)
IMM GRANULOCYTES NFR BLD AUTO: 0.4 % — HIGH (ref 0.1–0.3)
LYMPHOCYTES # BLD AUTO: 1.77 K/UL — SIGNIFICANT CHANGE UP (ref 1.2–3.4)
LYMPHOCYTES # BLD AUTO: 34.5 % — SIGNIFICANT CHANGE UP (ref 20.5–51.1)
MAGNESIUM SERPL-MCNC: 2.1 MG/DL — SIGNIFICANT CHANGE UP (ref 1.8–2.4)
MCHC RBC-ENTMCNC: 30.1 PG — SIGNIFICANT CHANGE UP (ref 27–31)
MCHC RBC-ENTMCNC: 32.2 G/DL — SIGNIFICANT CHANGE UP (ref 32–37)
MCV RBC AUTO: 93.4 FL — SIGNIFICANT CHANGE UP (ref 81–99)
MONOCYTES # BLD AUTO: 0.27 K/UL — SIGNIFICANT CHANGE UP (ref 0.1–0.6)
MONOCYTES NFR BLD AUTO: 5.3 % — SIGNIFICANT CHANGE UP (ref 1.7–9.3)
NEUTROPHILS # BLD AUTO: 2.82 K/UL — SIGNIFICANT CHANGE UP (ref 1.4–6.5)
NEUTROPHILS NFR BLD AUTO: 54.9 % — SIGNIFICANT CHANGE UP (ref 42.2–75.2)
NRBC # BLD: 0 /100 WBCS — SIGNIFICANT CHANGE UP (ref 0–0)
PHOSPHATE SERPL-MCNC: 3.8 MG/DL — SIGNIFICANT CHANGE UP (ref 2.1–4.9)
PLATELET # BLD AUTO: 366 K/UL — SIGNIFICANT CHANGE UP (ref 130–400)
POTASSIUM SERPL-MCNC: 4.5 MMOL/L — SIGNIFICANT CHANGE UP (ref 3.5–5)
POTASSIUM SERPL-SCNC: 4.5 MMOL/L — SIGNIFICANT CHANGE UP (ref 3.5–5)
PROT SERPL-MCNC: 6.4 G/DL — SIGNIFICANT CHANGE UP (ref 6–8)
RBC # BLD: 2.59 M/UL — LOW (ref 4.2–5.4)
RBC # FLD: 22.8 % — HIGH (ref 11.5–14.5)
SODIUM SERPL-SCNC: 139 MMOL/L — SIGNIFICANT CHANGE UP (ref 135–146)
VIT B12 SERPL-MCNC: 436 PG/ML — SIGNIFICANT CHANGE UP (ref 232–1245)
WBC # BLD: 5.13 K/UL — SIGNIFICANT CHANGE UP (ref 4.8–10.8)
WBC # FLD AUTO: 5.13 K/UL — SIGNIFICANT CHANGE UP (ref 4.8–10.8)

## 2022-01-14 PROCEDURE — 99233 SBSQ HOSP IP/OBS HIGH 50: CPT

## 2022-01-14 PROCEDURE — 99253 IP/OBS CNSLTJ NEW/EST LOW 45: CPT

## 2022-01-14 NOTE — PROGRESS NOTE ADULT - ASSESSMENT
51 year old patient, with no PMHx presented with chest pain, stabbing in nature, worse with movements, associated with tachycardia and neck pressure.  In ED, she was found to have a HB of 5.4 and to be COVID positive.     # Anemia  - In ED, Hb 5.4 s/p PRBC X 3  - SHANTHI: negative  - Anemia workup: Iron 166; TIBC 330  - Monitor CBC  - GI Consult: will benefit from outpatient egd and colonoscopy. Will consider inpatient workup if overt evidence of bleeding per GI tract.  - Keep Hb>8  - GYN consult for AUB: TVUS; possible endometrial biopsy.  outpatient follow-up with Dr. Potter @ [facility]    #Chest pain worsened with movements/Tachycardia  - EKG: normal sinus rhythm  - Troponin <0.01 x2  - D-dimer 785; Follow up CT Chest Angiogram  - Follow up Echo  - Cardiology on board: In light of severe anemia, no indications for invasive procedures as risks outweigh the benefits; also, use of anti-platelets and anticoagulation are not indicated of light of severe anemia. Will consider CCTA once Hb stable.     #COVID  - Chest X-ray: Negative  - On RA  - Asymptomatic    Diet: Regular  Activity: IAT  DVT ppx:none  GI ppx: none

## 2022-01-14 NOTE — CHART NOTE - NSCHARTNOTEFT_GEN_A_CORE
PGY2 Note    Patient re-offered EMBx. Risks, benefits and alternatives discussed, all questions answered. Patient does not want EMBx while in-house, would like to wait until she is seen outpatient.    Dr. Hassan and Dr. Valdez aware
Patient was seen & examined independently on bedside.   Latest vital signs, labs, imaging studies were reviewed today.  Consults reviewed.  Case was discussed with house staff in morning rounds for assessment and plan.

## 2022-01-14 NOTE — PROGRESS NOTE ADULT - TIME BILLING
51 year old patient, with no PMHx presented with chest pain, stabbing in nature, worse with movements, associated with tachycardia and neck pressure.      Acute normocytic Anemia, possible due to menorrhagia  Received 3 units of PRBC   GYN appreciated for possible GYN source for acute anemia  Chest pain worsened with movements/Tachycardia-possible symptomatic anemia. Likely non-cardiac  EKG & trops normal  COVID-no clinical PNA- Asymptomatic  Elevated d-dimer- PE ruled out  Pending  TVUS    Handoff: pending TVUS and possible endometrial biopsy if warranted

## 2022-01-14 NOTE — PROGRESS NOTE ADULT - SUBJECTIVE AND OBJECTIVE BOX
LENGTH OF HOSPITAL STAY: 1d    CHIEF COMPLAINT:   Patient is a 51y old  Female who presents with a chief complaint of Anemia (11 Jan 2022 19:37)      HISTORY OF PRESENTING ILLNESS:    HPI:   51F no pmhx presents with PELAYO,  pinching chest pain that started yesterday, intermittent, worse with certain motions, worse when she climbs the stairs, denies syncope/diaphoresis/shortness of breath. no echo/stress tests in the past, does not follow with cards.  Pt denies BRBPR, & melena.  Pt appears NAD accompanied by spouse providing additional HPI.   (11 Jan 2022 19:37)    No overnight events.    Subjective: Patient is seen and examined at bedside.   Patient reports chest pain, neck pressure and tachycardia upon movements only.   Vitals stable.     PAST MEDICAL & SURGICAL HISTORY  PAST MEDICAL & SURGICAL HISTORY:  No pertinent past medical history    No significant past surgical history      SOCIAL HISTORY:    ALLERGIES:  No Known Allergies    MEDICATIONS:  STANDING MEDICATIONS  influenza   Vaccine 0.5 milliLiter(s) IntraMuscular once  lactated ringers. 1000 milliLiter(s) IV Continuous <Continuous>    PRN MEDICATIONS  acetaminophen     Tablet .. 650 milliGRAM(s) Oral every 6 hours PRN  aluminum hydroxide/magnesium hydroxide/simethicone Suspension 30 milliLiter(s) Oral every 4 hours PRN  melatonin 5 milliGRAM(s) Oral at bedtime PRN  ondansetron Injectable 4 milliGRAM(s) IV Push every 8 hours PRN    VITALS:   T(F): 97.4  HR: 79  BP: 104/53  RR: 18  SpO2: 98%    LABS:                        5.6    6.64  )-----------( 467      ( 11 Jan 2022 17:53 )             17.9     01-11    135  |  103  |  14  ----------------------------<  111<H>  4.1   |  22  |  0.5<L>    Ca    8.2<L>      11 Jan 2022 17:53  Mg     2.1     01-11    TPro  6.8  /  Alb  4.3  /  TBili  2.7<H>  /  DBili  x   /  AST  38  /  ALT  90<H>  /  AlkPhos  120<H>  01-11          Troponin T, Serum: <0.01 ng/mL (01-11-22 @ 17:53)      CARDIAC MARKERS ( 11 Jan 2022 17:53 )  x     / <0.01 ng/mL / x     / x     / x          RADIOLOGY:    PHYSICAL EXAM:  GEN: No acute distress  LUNGS: Clear to auscultation bilaterally   HEART: S1/S2 present.  ABD: Soft, non-tender, non-distended.   EXT: no peripheral edema  NEURO: AAOX3    
LENGTH OF HOSPITAL STAY: 2d    CHIEF COMPLAINT:   Patient is a 51y old  Female who presents with a chief complaint of Anemia (12 Jan 2022 13:12)      HISTORY OF PRESENTING ILLNESS:    HPI:   51F no pmhx presents with PELAYO,  pinching chest pain that started yesterday, intermittent, worse with certain motions, worse when she climbs the stairs, denies syncope/diaphoresis/shortness of breath. no echo/stress tests in the past, does not follow with cards.  Pt denies BRBPR, & melena.  Pt appears NAD accompanied by spouse providing additional HPI.   (11 Jan 2022 19:37)    No overnight events.    Subjective: Patient is seen and examined at bedside.   Patient reports chest pain, neck pressure and tachycardia upon movements only.   Vitals stable.     PAST MEDICAL & SURGICAL HISTORY  PAST MEDICAL & SURGICAL HISTORY:  No pertinent past medical history    No significant past surgical history      SOCIAL HISTORY:    ALLERGIES:  No Known Allergies    MEDICATIONS:  STANDING MEDICATIONS  influenza   Vaccine 0.5 milliLiter(s) IntraMuscular once  lactated ringers. 1000 milliLiter(s) IV Continuous <Continuous>    PRN MEDICATIONS  acetaminophen     Tablet .. 650 milliGRAM(s) Oral every 6 hours PRN  aluminum hydroxide/magnesium hydroxide/simethicone Suspension 30 milliLiter(s) Oral every 4 hours PRN  melatonin 5 milliGRAM(s) Oral at bedtime PRN  ondansetron Injectable 4 milliGRAM(s) IV Push every 8 hours PRN    VITALS:   T(F): 98.4  HR: 78  BP: 112/59  RR: 18  SpO2: 99%    LABS:                        8.0    6.16  )-----------( 398      ( 12 Jan 2022 20:00 )             24.9     01-11    135  |  103  |  14  ----------------------------<  111<H>  4.1   |  22  |  0.5<L>    Ca    8.2<L>      11 Jan 2022 17:53  Mg     2.1     01-11    TPro  6.8  /  Alb  4.3  /  TBili  2.7<H>  /  DBili  x   /  AST  38  /  ALT  90<H>  /  AlkPhos  120<H>  01-11          Troponin T, Serum: <0.01 ng/mL (01-12-22 @ 20:00)  Troponin T, Serum: <0.01 ng/mL (01-12-22 @ 11:00)      CARDIAC MARKERS ( 12 Jan 2022 20:00 )  x     / <0.01 ng/mL / x     / x     / x      CARDIAC MARKERS ( 12 Jan 2022 11:00 )  x     / <0.01 ng/mL / x     / x     / x      CARDIAC MARKERS ( 11 Jan 2022 17:53 )  x     / <0.01 ng/mL / x     / x     / x          RADIOLOGY:    PHYSICAL EXAM:  GEN: No acute distress  LUNGS: Clear to auscultation bilaterally   HEART: S1/S2 present.  ABD: Soft, non-tender, non-distended.   EXT: no peripheral edema  NEURO: AAOX3    
LENGTH OF HOSPITAL STAY: 3d    CHIEF COMPLAINT:   Patient is a 51y old  Female who presents with a chief complaint of Anemia (13 Jan 2022 15:04)      HISTORY OF PRESENTING ILLNESS:    HPI:   51F no pmhx presents with PELAYO,  pinching chest pain that started yesterday, intermittent, worse with certain motions, worse when she climbs the stairs, denies syncope/diaphoresis/shortness of breath. no echo/stress tests in the past, does not follow with cards.  Pt denies BRBPR, & melena.  Pt appears NAD accompanied by spouse providing additional HPI.   (11 Jan 2022 19:37)    No overnight events.    Subjective: Patient is seen and examined at bedside.   Patient reports neck pressure and headache overnight.  Vitals stable.       PAST MEDICAL & SURGICAL HISTORY  PAST MEDICAL & SURGICAL HISTORY:  No pertinent past medical history    No significant past surgical history      SOCIAL HISTORY:    ALLERGIES:  No Known Allergies    MEDICATIONS:  STANDING MEDICATIONS  influenza   Vaccine 0.5 milliLiter(s) IntraMuscular once  lactated ringers. 1000 milliLiter(s) IV Continuous <Continuous>    PRN MEDICATIONS  acetaminophen     Tablet .. 650 milliGRAM(s) Oral every 6 hours PRN  aluminum hydroxide/magnesium hydroxide/simethicone Suspension 30 milliLiter(s) Oral every 4 hours PRN  melatonin 5 milliGRAM(s) Oral at bedtime PRN  ondansetron Injectable 4 milliGRAM(s) IV Push every 8 hours PRN    VITALS:   T(F): 97.3  HR: 75  BP: 108/60  RR: 18  SpO2: 95%    LABS:                        7.8    5.13  )-----------( 366      ( 14 Jan 2022 06:30 )             24.2     01-14    139  |  105  |  10  ----------------------------<  101<H>  4.5   |  19  |  0.6<L>    Ca    8.8      14 Jan 2022 06:30  Phos  3.8     01-14  Mg     2.1     01-14    TPro  6.4  /  Alb  4.0  /  TBili  2.4<H>  /  DBili  x   /  AST  37  /  ALT  84<H>  /  AlkPhos  119<H>  01-14              CARDIAC MARKERS ( 12 Jan 2022 20:00 )  x     / <0.01 ng/mL / x     / x     / x      CARDIAC MARKERS ( 12 Jan 2022 11:00 )  x     / <0.01 ng/mL / x     / x     / x          RADIOLOGY:    PHYSICAL EXAM:  GEN: No acute distress  LUNGS: Clear to auscultation bilaterally   HEART: S1/S2 present.  ABD: Soft, non-tender, non-distended.   EXT: no peripheral edema  NEURO: AAOX3

## 2022-01-15 ENCOUNTER — TRANSCRIPTION ENCOUNTER (OUTPATIENT)
Age: 52
End: 2022-01-15

## 2022-01-15 VITALS
SYSTOLIC BLOOD PRESSURE: 105 MMHG | HEART RATE: 75 BPM | RESPIRATION RATE: 18 BRPM | TEMPERATURE: 98 F | DIASTOLIC BLOOD PRESSURE: 57 MMHG | OXYGEN SATURATION: 98 %

## 2022-01-15 LAB
ALBUMIN SERPL ELPH-MCNC: 4.6 G/DL — SIGNIFICANT CHANGE UP (ref 3.5–5.2)
ALP SERPL-CCNC: 122 U/L — HIGH (ref 30–115)
ALT FLD-CCNC: 81 U/L — HIGH (ref 0–41)
ANION GAP SERPL CALC-SCNC: 13 MMOL/L — SIGNIFICANT CHANGE UP (ref 7–14)
AST SERPL-CCNC: 29 U/L — SIGNIFICANT CHANGE UP (ref 0–41)
BASOPHILS # BLD AUTO: 0.01 K/UL — SIGNIFICANT CHANGE UP (ref 0–0.2)
BASOPHILS # BLD AUTO: 0.02 K/UL — SIGNIFICANT CHANGE UP (ref 0–0.2)
BASOPHILS NFR BLD AUTO: 0.2 % — SIGNIFICANT CHANGE UP (ref 0–1)
BASOPHILS NFR BLD AUTO: 0.4 % — SIGNIFICANT CHANGE UP (ref 0–1)
BILIRUB SERPL-MCNC: 2 MG/DL — HIGH (ref 0.2–1.2)
BUN SERPL-MCNC: 12 MG/DL — SIGNIFICANT CHANGE UP (ref 10–20)
CALCIUM SERPL-MCNC: 9.6 MG/DL — SIGNIFICANT CHANGE UP (ref 8.5–10.1)
CHLORIDE SERPL-SCNC: 104 MMOL/L — SIGNIFICANT CHANGE UP (ref 98–110)
CO2 SERPL-SCNC: 22 MMOL/L — SIGNIFICANT CHANGE UP (ref 17–32)
CREAT SERPL-MCNC: 0.6 MG/DL — LOW (ref 0.7–1.5)
EOSINOPHIL # BLD AUTO: 0.16 K/UL — SIGNIFICANT CHANGE UP (ref 0–0.7)
EOSINOPHIL # BLD AUTO: 0.21 K/UL — SIGNIFICANT CHANGE UP (ref 0–0.7)
EOSINOPHIL NFR BLD AUTO: 3.1 % — SIGNIFICANT CHANGE UP (ref 0–8)
EOSINOPHIL NFR BLD AUTO: 3.9 % — SIGNIFICANT CHANGE UP (ref 0–8)
GLUCOSE SERPL-MCNC: 108 MG/DL — HIGH (ref 70–99)
HCT VFR BLD CALC: 25.3 % — LOW (ref 37–47)
HCT VFR BLD CALC: 25.4 % — LOW (ref 37–47)
HGB BLD-MCNC: 8.1 G/DL — LOW (ref 12–16)
HGB BLD-MCNC: 8.2 G/DL — LOW (ref 12–16)
IMM GRANULOCYTES NFR BLD AUTO: 0.4 % — HIGH (ref 0.1–0.3)
IMM GRANULOCYTES NFR BLD AUTO: 0.4 % — HIGH (ref 0.1–0.3)
LYMPHOCYTES # BLD AUTO: 1.51 K/UL — SIGNIFICANT CHANGE UP (ref 1.2–3.4)
LYMPHOCYTES # BLD AUTO: 2.24 K/UL — SIGNIFICANT CHANGE UP (ref 1.2–3.4)
LYMPHOCYTES # BLD AUTO: 28.8 % — SIGNIFICANT CHANGE UP (ref 20.5–51.1)
LYMPHOCYTES # BLD AUTO: 41.3 % — SIGNIFICANT CHANGE UP (ref 20.5–51.1)
MAGNESIUM SERPL-MCNC: 2.1 MG/DL — SIGNIFICANT CHANGE UP (ref 1.8–2.4)
MCHC RBC-ENTMCNC: 30 PG — SIGNIFICANT CHANGE UP (ref 27–31)
MCHC RBC-ENTMCNC: 30.6 PG — SIGNIFICANT CHANGE UP (ref 27–31)
MCHC RBC-ENTMCNC: 31.9 G/DL — LOW (ref 32–37)
MCHC RBC-ENTMCNC: 32.4 G/DL — SIGNIFICANT CHANGE UP (ref 32–37)
MCV RBC AUTO: 94.1 FL — SIGNIFICANT CHANGE UP (ref 81–99)
MCV RBC AUTO: 94.4 FL — SIGNIFICANT CHANGE UP (ref 81–99)
MONOCYTES # BLD AUTO: 0.22 K/UL — SIGNIFICANT CHANGE UP (ref 0.1–0.6)
MONOCYTES # BLD AUTO: 0.28 K/UL — SIGNIFICANT CHANGE UP (ref 0.1–0.6)
MONOCYTES NFR BLD AUTO: 4.2 % — SIGNIFICANT CHANGE UP (ref 1.7–9.3)
MONOCYTES NFR BLD AUTO: 5.2 % — SIGNIFICANT CHANGE UP (ref 1.7–9.3)
NEUTROPHILS # BLD AUTO: 2.65 K/UL — SIGNIFICANT CHANGE UP (ref 1.4–6.5)
NEUTROPHILS # BLD AUTO: 3.32 K/UL — SIGNIFICANT CHANGE UP (ref 1.4–6.5)
NEUTROPHILS NFR BLD AUTO: 48.8 % — SIGNIFICANT CHANGE UP (ref 42.2–75.2)
NEUTROPHILS NFR BLD AUTO: 63.3 % — SIGNIFICANT CHANGE UP (ref 42.2–75.2)
NRBC # BLD: 0 /100 WBCS — SIGNIFICANT CHANGE UP (ref 0–0)
NRBC # BLD: 0 /100 WBCS — SIGNIFICANT CHANGE UP (ref 0–0)
PHOSPHATE SERPL-MCNC: 3.7 MG/DL — SIGNIFICANT CHANGE UP (ref 2.1–4.9)
PLATELET # BLD AUTO: 414 K/UL — HIGH (ref 130–400)
PLATELET # BLD AUTO: 419 K/UL — HIGH (ref 130–400)
POTASSIUM SERPL-MCNC: 4.8 MMOL/L — SIGNIFICANT CHANGE UP (ref 3.5–5)
POTASSIUM SERPL-SCNC: 4.8 MMOL/L — SIGNIFICANT CHANGE UP (ref 3.5–5)
PROT SERPL-MCNC: 7.2 G/DL — SIGNIFICANT CHANGE UP (ref 6–8)
RBC # BLD: 2.68 M/UL — LOW (ref 4.2–5.4)
RBC # BLD: 2.7 M/UL — LOW (ref 4.2–5.4)
RBC # FLD: 22.6 % — HIGH (ref 11.5–14.5)
RBC # FLD: 23.3 % — HIGH (ref 11.5–14.5)
SODIUM SERPL-SCNC: 139 MMOL/L — SIGNIFICANT CHANGE UP (ref 135–146)
WBC # BLD: 5.24 K/UL — SIGNIFICANT CHANGE UP (ref 4.8–10.8)
WBC # BLD: 5.42 K/UL — SIGNIFICANT CHANGE UP (ref 4.8–10.8)
WBC # FLD AUTO: 5.24 K/UL — SIGNIFICANT CHANGE UP (ref 4.8–10.8)
WBC # FLD AUTO: 5.42 K/UL — SIGNIFICANT CHANGE UP (ref 4.8–10.8)

## 2022-01-15 PROCEDURE — 76856 US EXAM PELVIC COMPLETE: CPT | Mod: 26

## 2022-01-15 PROCEDURE — 99239 HOSP IP/OBS DSCHRG MGMT >30: CPT

## 2022-01-15 NOTE — DISCHARGE NOTE PROVIDER - CARE PROVIDERS DIRECT ADDRESSES
,DirectAddress_Unknown,[email or web address],[email or web address]

## 2022-01-15 NOTE — DISCHARGE NOTE PROVIDER - NSDCCPCAREPLAN_GEN_ALL_CORE_FT
PRINCIPAL DISCHARGE DIAGNOSIS  Diagnosis: Anemia  Assessment and Plan of Treatment:        PRINCIPAL DISCHARGE DIAGNOSIS  Diagnosis: Anemia  Assessment and Plan of Treatment: Possibly related to menorrhagia. She was offered endometrial biopsy but she refusee  Handoff: Acute inpatient management  required further & wants to follow with GYN for outpatient for biopsy.

## 2022-01-15 NOTE — DISCHARGE NOTE PROVIDER - NSDCCAREPROVSEEN_GEN_ALL_CORE_FT
Benji Jackson- [facility] Hospitalist  Susan Tinsley - [facility] Gastroenterology  Maia Garcia- [facility] GYN

## 2022-01-15 NOTE — DISCHARGE NOTE NURSING/CASE MANAGEMENT/SOCIAL WORK - PATIENT PORTAL LINK FT
You can access the FollowMyHealth Patient Portal offered by [facility] by registering at the following website: [email or web address]. By joining [employer]’s FollowMyHealth portal, you will also be able to view your health information using other applications (apps) compatible with our system.

## 2022-01-15 NOTE — DISCHARGE NOTE NURSING/CASE MANAGEMENT/SOCIAL WORK - NSDCPEFALRISK_GEN_ALL_CORE
For information on Fall & Injury Prevention, visit: [email or web address] OR  [email or web address] OR  https://www.cdc.gov/steadi/patient.html

## 2022-01-15 NOTE — DISCHARGE NOTE PROVIDER - PROVIDER TOKENS
FREE:[LAST:[Abbey],FIRST:[Andrés],PHONE:[phone]],FAX:[(   )    -],FOLLOWUP:[1 week]],PROVIDER:[TOKEN:[[id number]],FOLLOWUP:[1-3 days]] PROVIDER:[TOKEN:[[id number]],FOLLOWUP:[1-3 days]],FREE:[LAST:[Abbey],FIRST:[Andrés],PHONE:[phone]],FAX:[(   )    -],FOLLOWUP:[1 week]],PROVIDER:[TOKEN:[[id number]],FOLLOWUP:[1 week]]

## 2022-01-15 NOTE — DISCHARGE NOTE PROVIDER - HOSPITAL COURSE
51F no pmhx presents with PELAYO,  pinching chest pain that started one frazier sgo, intermittent, worse with certain motions, worse when she climbs the stairs, denies syncope/diaphoresis/shortness of breath. no echo/stress tests in the past, does not follow with cards.  Pt denies BRBPR, & melena.  Pt appears NAD accompanied by spouse providing additional HPI.    She was found to have severe acute normocytic anemia which was symtomatic. Received PRBC X 3 She was also having chest pain which was likely non-cardiac and also in contribution to dyspnea. HEr EKG and trops were normal. Cardiology & GI were taken on board but both recommended non-invasive management.   GYN was consulted who got TVUS which showed as follows.  1.  Left ovarian 2.6 cm septated cyst. Right ovarian subcentimeter   follicles.  2.  Endometrial stripe thickness within normal limits for a menstruating   female. Thickened for a postmenopausal female.  Patient was offered endomatrial biopsy which she refused and wants to get as outpatient. Most likely cause of her anemia is DUB. Her H/H is now stable    Attending Attestation:  Patient was seen & examined independently. At least 10 systems were reviewed in ROS. All systems reviewed  are within normal limits. Latest vital signs and labs were reviewed today. Case was discussed with house staff in morning rounds for assessment and plan.  Patient is medically stable for discharge . About 32 mins spent on discharge disposition.   51F no pmhx presents with PELAYO,  pinching chest pain that started one frazier sgo, intermittent, worse with certain motions, worse when she climbs the stairs, denies syncope/diaphoresis/shortness of breath. no echo/stress tests in the past, does not follow with cards.  Pt denies BRBPR, & melena.  Pt appears NAD accompanied by spouse providing additional HPI.    She was found to have severe acute normocytic anemia which was symtomatic. Received PRBC X 3 She was also having chest pain which was likely non-cardiac and also in contribution to dyspnea. HEr EKG and trops were normal. Cardiology & GI were taken on board but both recommended non-invasive management.   GYN was consulted who got TVUS which showed as follows.  1.  Left ovarian 2.6 cm septated cyst. Right ovarian subcentimeter   follicles.  2.  Endometrial stripe thickness within normal limits for a menstruating   female. Thickened for a postmenopausal female.  Patient was offered endomatrial biopsy which she refused and wants to get as outpatient. Most likely cause of her anemia is DUB. Her H/H is now stable. Pt examined by Dr. Jackson and medically cleared for discharge home by Dr. Jackson. Discharge summary is a brief recap of pt's current hospital course. Please refer to pt's medical record for this hospitalization for detailed course.      Attending Attestation:  Patient was seen & examined independently. At least 10 systems were reviewed in ROS. All systems reviewed  are within normal limits. Latest vital signs and labs were reviewed today. Case was discussed with house staff in morning rounds for assessment and plan.  Patient is medically stable for discharge . About 32 mins spent on discharge disposition.

## 2022-01-15 NOTE — DISCHARGE NOTE PROVIDER - NSDCQMSTROKE_NEU_ALL_CORE
Bed: H-02  Expected date:   Expected time:   Means of arrival:   Comments:  ems     Elva Romano RN  12/06/21 5430 No

## 2022-01-15 NOTE — DISCHARGE NOTE PROVIDER - CARE PROVIDER_API CALL
Andrés Potter  Phone: [phone]  Fax: (   )    -  Follow Up Time: 1 week    Christina Soria  INTERNAL MEDICINE  [address] [place] [address]  Phone: [phone]  Fax: [phone]  Follow Up Time: 1-3 days   Christina Soria  INTERNAL MEDICINE  [address] [place] [address]  Phone: [phone]  Fax: [phone]  Follow Up Time: 1-3 days    Andrés Potter  Phone: [phone]  Fax: (   )    -  Follow Up Time: 1 week    Susan Tinsley)  Gastroenterology; Internal Medicine  [address] [place] [id number]  Phone: [phone]  Fax: [phone]  Follow Up Time: 1 week

## 2022-01-15 NOTE — DISCHARGE NOTE PROVIDER - NSDCFUADDAPPT_GEN_ALL_CORE_FT
Please follow up with Gastroenterology doctor in 1-2 weeks for medical follow up and to discuss if EGD/Colonoscopy is appropriate

## 2022-01-20 DIAGNOSIS — N92.0 EXCESSIVE AND FREQUENT MENSTRUATION WITH REGULAR CYCLE: ICD-10-CM

## 2022-01-20 DIAGNOSIS — N93.8 OTHER SPECIFIED ABNORMAL UTERINE AND VAGINAL BLEEDING: ICD-10-CM

## 2022-01-20 DIAGNOSIS — Z53.20 PROCEDURE AND TREATMENT NOT CARRIED OUT BECAUSE OF PATIENT'S DECISION FOR UNSPECIFIED REASONS: ICD-10-CM

## 2022-01-20 DIAGNOSIS — R79.1 ABNORMAL COAGULATION PROFILE: ICD-10-CM

## 2022-01-20 DIAGNOSIS — R06.02 SHORTNESS OF BREATH: ICD-10-CM

## 2022-01-20 DIAGNOSIS — U07.1 COVID-19: ICD-10-CM

## 2022-01-20 DIAGNOSIS — J39.8 OTHER SPECIFIED DISEASES OF UPPER RESPIRATORY TRACT: ICD-10-CM

## 2022-01-20 DIAGNOSIS — R00.0 TACHYCARDIA, UNSPECIFIED: ICD-10-CM

## 2022-01-20 DIAGNOSIS — R07.89 OTHER CHEST PAIN: ICD-10-CM

## 2022-01-20 DIAGNOSIS — M54.2 CERVICALGIA: ICD-10-CM

## 2022-01-20 DIAGNOSIS — N83.292 OTHER OVARIAN CYST, LEFT SIDE: ICD-10-CM

## 2022-01-20 DIAGNOSIS — D62 ACUTE POSTHEMORRHAGIC ANEMIA: ICD-10-CM

## 2022-01-20 DIAGNOSIS — Z92.22 PERSONAL HISTORY OF MONOCLONAL DRUG THERAPY: ICD-10-CM

## 2022-01-20 DIAGNOSIS — R06.00 DYSPNEA, UNSPECIFIED: ICD-10-CM
